# Patient Record
Sex: FEMALE | Race: WHITE | NOT HISPANIC OR LATINO | Employment: UNEMPLOYED | URBAN - METROPOLITAN AREA
[De-identification: names, ages, dates, MRNs, and addresses within clinical notes are randomized per-mention and may not be internally consistent; named-entity substitution may affect disease eponyms.]

---

## 2023-04-24 PROBLEM — F32.A ANXIETY AND DEPRESSION: Status: ACTIVE | Noted: 2023-04-24

## 2023-04-24 PROBLEM — F41.9 ANXIETY AND DEPRESSION: Status: ACTIVE | Noted: 2023-04-24

## 2023-04-24 PROBLEM — F43.10 PTSD (POST-TRAUMATIC STRESS DISORDER): Status: ACTIVE | Noted: 2023-04-24

## 2023-04-24 PROBLEM — R63.5 ABNORMAL WEIGHT GAIN: Status: ACTIVE | Noted: 2023-04-24

## 2023-04-24 RX ORDER — TOPIRAMATE 100 MG/1
100 TABLET, FILM COATED ORAL DAILY
COMMUNITY
Start: 2022-04-03 | End: 2023-07-11

## 2023-04-24 RX ORDER — HYDROXYZINE HYDROCHLORIDE 25 MG/1
25 TABLET, FILM COATED ORAL ONCE
COMMUNITY
Start: 2022-03-03

## 2023-04-24 RX ORDER — ESCITALOPRAM OXALATE 10 MG/1
10 TABLET ORAL DAILY
COMMUNITY
Start: 2022-10-26 | End: 2023-07-11

## 2023-04-24 RX ORDER — ACYCLOVIR 400 MG/1
400 TABLET ORAL
COMMUNITY
Start: 2020-01-20 | End: 2023-07-11 | Stop reason: ALTCHOICE

## 2023-04-24 RX ORDER — CLONIDINE HYDROCHLORIDE 0.1 MG/1
1 TABLET ORAL 2 TIMES DAILY
COMMUNITY
Start: 2022-10-26

## 2023-05-05 ENCOUNTER — APPOINTMENT (OUTPATIENT)
Dept: PRIMARY CARE | Facility: CLINIC | Age: 48
End: 2023-05-05
Payer: MEDICARE

## 2023-07-11 ENCOUNTER — OFFICE VISIT (OUTPATIENT)
Dept: PRIMARY CARE | Facility: CLINIC | Age: 48
End: 2023-07-11
Payer: MEDICARE

## 2023-07-11 VITALS
HEART RATE: 76 BPM | DIASTOLIC BLOOD PRESSURE: 84 MMHG | BODY MASS INDEX: 36.64 KG/M2 | HEIGHT: 66 IN | OXYGEN SATURATION: 98 % | WEIGHT: 228 LBS | TEMPERATURE: 97.4 F | RESPIRATION RATE: 16 BRPM | SYSTOLIC BLOOD PRESSURE: 124 MMHG

## 2023-07-11 DIAGNOSIS — R29.818 SUSPECTED SLEEP APNEA: Primary | ICD-10-CM

## 2023-07-11 DIAGNOSIS — R79.9 ABNORMAL FINDING OF BLOOD CHEMISTRY, UNSPECIFIED: ICD-10-CM

## 2023-07-11 DIAGNOSIS — E78.2 MIXED HYPERLIPIDEMIA: ICD-10-CM

## 2023-07-11 DIAGNOSIS — R53.83 OTHER FATIGUE: ICD-10-CM

## 2023-07-11 DIAGNOSIS — E55.9 VITAMIN D INSUFFICIENCY: ICD-10-CM

## 2023-07-11 DIAGNOSIS — R73.9 HYPERGLYCEMIA: ICD-10-CM

## 2023-07-11 PROCEDURE — 99213 OFFICE O/P EST LOW 20 MIN: CPT

## 2023-07-11 PROCEDURE — 1036F TOBACCO NON-USER: CPT

## 2023-07-11 RX ORDER — METFORMIN HYDROCHLORIDE 750 MG/1
750 TABLET, EXTENDED RELEASE ORAL DAILY
COMMUNITY
Start: 2023-06-21

## 2023-07-11 RX ORDER — VILAZODONE HYDROCHLORIDE 20 MG/1
10 TABLET ORAL 2 TIMES DAILY
COMMUNITY
Start: 2023-04-19

## 2023-07-11 RX ORDER — QUETIAPINE FUMARATE 50 MG/1
50 TABLET, FILM COATED ORAL NIGHTLY
COMMUNITY
Start: 2023-04-19

## 2023-07-11 SDOH — ECONOMIC STABILITY: FOOD INSECURITY: WITHIN THE PAST 12 MONTHS, THE FOOD YOU BOUGHT JUST DIDN'T LAST AND YOU DIDN'T HAVE MONEY TO GET MORE.: NEVER TRUE

## 2023-07-11 SDOH — ECONOMIC STABILITY: FOOD INSECURITY: WITHIN THE PAST 12 MONTHS, YOU WORRIED THAT YOUR FOOD WOULD RUN OUT BEFORE YOU GOT MONEY TO BUY MORE.: NEVER TRUE

## 2023-07-11 ASSESSMENT — ENCOUNTER SYMPTOMS
PSYCHIATRIC NEGATIVE: 1
DIAPHORESIS: 1
SHORTNESS OF BREATH: 1
FATIGUE: 1
CARDIOVASCULAR NEGATIVE: 1
MUSCULOSKELETAL NEGATIVE: 1
NEUROLOGICAL NEGATIVE: 1
EYES NEGATIVE: 1
GASTROINTESTINAL NEGATIVE: 1
ENDOCRINE NEGATIVE: 1
HEMATOLOGIC/LYMPHATIC NEGATIVE: 1

## 2023-07-11 ASSESSMENT — LIFESTYLE VARIABLES
AUDIT-C TOTAL SCORE: 0
HOW MANY STANDARD DRINKS CONTAINING ALCOHOL DO YOU HAVE ON A TYPICAL DAY: PATIENT DOES NOT DRINK
HOW OFTEN DO YOU HAVE SIX OR MORE DRINKS ON ONE OCCASION: NEVER
HOW OFTEN DO YOU HAVE A DRINK CONTAINING ALCOHOL: NEVER
SKIP TO QUESTIONS 9-10: 1

## 2023-07-11 ASSESSMENT — PATIENT HEALTH QUESTIONNAIRE - PHQ9
1. LITTLE INTEREST OR PLEASURE IN DOING THINGS: NOT AT ALL
2. FEELING DOWN, DEPRESSED OR HOPELESS: NOT AT ALL
SUM OF ALL RESPONSES TO PHQ9 QUESTIONS 1 & 2: 0

## 2023-07-11 ASSESSMENT — PAIN SCALES - GENERAL: PAINLEVEL: 0-NO PAIN

## 2023-07-11 NOTE — PATIENT INSTRUCTIONS
Thank you for coming to see me today.  If you have any questions or concerns following our visit, please contact the office.  Phone: [phone]    Follow up with me in 6 months or sooner as needed    1)  Get fasting labwork in the next 1-2 weeks.  The lab is down the [place] from our office.     2)  I am referring you to sleep medicine - please call [phone] to schedule an appointment or stop to 's office (in the lab) on your way out today      3)  Increase your water intake- goal water intake is 64 oz per day     4) Watch salt in diet, many foods already contain high amounts of sodium. Check nutrition facts for serving size and amount of sodium. Goal 2300 mg sodium or less per day    5) Decrease carbohydrates and sugar to assist with weight

## 2023-07-11 NOTE — PROGRESS NOTES
Subjective   Patient ID: Praveena Srtong is a 48 y.o. female who presents for 8 month follow up and visit to hospitals care.    Reports intermittent lower extremity edema that hurts to walk, notices up to calf. Some swelling in her hands.  Has never had these symptoms before. Denies having had a high salt diet, but reports not eating a lot. Snacking a lot.  Feeling very tired around 12:30/1 PM.    Reports energy level is lower than normal but OK. Feeling tired during the day for several months.       Diet: PB&J crustables, Bang energy drink, chips. Eating a lot of strawberries, salad once and a while. Doesn't like to eat a lot of veggies, tends to eat them in the winter- frozen veggies.  Doesn't eat meat, not getting much protein.  Lactose intolerant, not getting a lot of dairy. Not drinking pop a lot or if she does drinks clear soda. Has cut a lot of candy/sugar out of her diet  Exercise: Walking around the block for about 10-20 minutes daily regardless of weather. Moving with her granddaughter who is 6 and constantly moving.   Weight: Started on metformin to assist with weight loss, has never been this heavy before  Water: Drinking 16 oz per day  Sleep: Good sleep, getting at least 6 hours per night. Unsure if she snores, sometimes waking up with headaches, waking up with dry mouth daily.  Has never had sleep testing. Naps daily for about an hour at a time, feels tired a few hours later but doesn't take evening nap  Social: Single- in a relationship, lives with her daughter (25) in an apartment. 14 stairs to get to apartment, no issues with stairs. No pets  Professional: Currently medically disabled, former a  and     Review of Systems   Constitutional:  Positive for diaphoresis and fatigue.   HENT: Negative.     Eyes: Negative.    Respiratory:  Positive for shortness of breath.    Cardiovascular: Negative.    Gastrointestinal: Negative.    Endocrine: Negative.    Genitourinary:  "Negative.    Musculoskeletal: Negative.    Skin: Negative.    Neurological: Negative.    Hematological: Negative.    Psychiatric/Behavioral: Negative.          Current Outpatient Medications   Medication Sig Dispense Refill    cloNIDine (Catapres) 0.1 mg tablet Take 1 tablet (0.1 mg) by mouth 2 times a day.      hydrOXYzine HCL (Atarax) 25 mg tablet Take 1 tablet (25 mg) by mouth 1 time.      metFORMIN XR (Glucophage-XR) 750 mg 24 hr tablet Take 1 tablet (750 mg) by mouth once daily.      QUEtiapine (SEROquel) 50 mg tablet Take 1 tablet (50 mg) by mouth once daily at bedtime.      vilazodone (Viibryd) 10 mg tablet Take 1 tablet (10 mg) by mouth once daily with a meal.       No current facility-administered medications for this visit.     Past Surgical History:   Procedure Laterality Date    OTHER SURGICAL HISTORY  11/04/2022    Partial hysterectomy    OTHER SURGICAL HISTORY  11/04/2022    [place] tooth extraction     Family History   Problem Relation Name Age of Onset    Alcohol abuse Mother      Esophageal varices Father      Diabetes Father      Rheum arthritis Father      Multiple sclerosis Sister      No Known Problems Brother      No Known Problems Daughter        Social History     Tobacco Use    Smoking status: Former     Packs/day: 0.50     Years: 30.00     Total pack years: 15.00     Types: Cigarettes    Smokeless tobacco: Never   Vaping Use    Vaping Use: Every day    Substances: Flavoring    Devices: Disposable, Pre-filled or refillable cartridge    Passive vaping exposure: Yes   Substance Use Topics    Alcohol use: Never    Drug use: Never        Objective     Visit Vitals  /84 (BP Location: Right arm, Patient Position: Sitting, BP Cuff Size: Adult)   Pulse 76   Temp 36.3 °C (97.4 °F) (Temporal)   Resp 16   Ht 1.676 m (5' 6\")   Wt 103 kg (228 lb)   SpO2 98%   BMI 36.80 kg/m²   Smoking Status Former   BSA 2.19 m²        Physical Exam  Constitutional:       Appearance: Normal appearance.   HENT:      " Head: Normocephalic and atraumatic.   Eyes:      Extraocular Movements: Extraocular movements intact.      Pupils: Pupils are equal, round, and reactive to light.   Cardiovascular:      Rate and Rhythm: Normal rate and regular rhythm.   Pulmonary:      Effort: Pulmonary effort is normal.      Breath sounds: Normal breath sounds.   Abdominal:      General: Abdomen is flat. Bowel sounds are normal.      Palpations: Abdomen is soft.   Musculoskeletal:         General: Normal range of motion.   Neurological:      General: No focal deficit present.      Mental Status: She is alert and oriented to person, place, and time.   Psychiatric:         Mood and Affect: Mood normal.         Behavior: Behavior normal.           Assessment/Plan   Problem List Items Addressed This Visit       Mixed hyperlipidemia     Lipid panel from 11/2022 demonstrating , , Tri's 194    Repeat fasting lipid panel  Dietary modifications recommended         Relevant Orders    CBC    Basic Metabolic Panel    Lipid Panel    Follow Up In Primary Care - Established    Suspected sleep apnea - Primary     Persistent fatigue and symptoms as above  Refer to sleep medicine for home sleep study and evaluation for sleep apnea    Check iron, ferritin, B12 and vitamin D to rule out organic causes of fatigue         Relevant Orders    Referral to Adult Sleep Medicine     Other Visit Diagnoses       Other fatigue        Relevant Orders    Iron and TIBC    Ferritin    Vitamin B12    Hyperglycemia        Relevant Orders    Hemoglobin A1C    Follow Up In Primary Care - Established    Vitamin D insufficiency        Relevant Orders    Vitamin D, Total    Abnormal finding of blood chemistry, unspecified        Relevant Orders    Iron and TIBC    Ferritin    Follow Up In Primary Care - Established            All pertinent lab work and results were reviewed with patient.     Follow up with me in 6 months or sooner     MEG Villeda-CNS

## 2023-07-11 NOTE — ASSESSMENT & PLAN NOTE
Persistent fatigue and symptoms as above  Refer to sleep medicine for home sleep study and evaluation for sleep apnea    Check iron, ferritin, B12 and vitamin D to rule out organic causes of fatigue

## 2024-01-03 ENCOUNTER — LAB (OUTPATIENT)
Dept: LAB | Facility: LAB | Age: 49
End: 2024-01-03
Payer: MEDICARE

## 2024-01-03 DIAGNOSIS — R79.9 ABNORMAL FINDING OF BLOOD CHEMISTRY, UNSPECIFIED: ICD-10-CM

## 2024-01-03 DIAGNOSIS — E78.2 MIXED HYPERLIPIDEMIA: ICD-10-CM

## 2024-01-03 DIAGNOSIS — R53.83 OTHER FATIGUE: ICD-10-CM

## 2024-01-03 DIAGNOSIS — R73.9 HYPERGLYCEMIA: ICD-10-CM

## 2024-01-03 DIAGNOSIS — E55.9 VITAMIN D INSUFFICIENCY: ICD-10-CM

## 2024-01-03 LAB
25(OH)D3 SERPL-MCNC: 29 NG/ML (ref 30–100)
ANION GAP SERPL CALC-SCNC: 11 MMOL/L (ref 10–20)
BUN SERPL-MCNC: 11 MG/DL (ref 6–23)
CALCIUM SERPL-MCNC: 10.8 MG/DL (ref 8.6–10.3)
CHLORIDE SERPL-SCNC: 101 MMOL/L (ref 98–107)
CHOLEST SERPL-MCNC: 226 MG/DL (ref 0–199)
CHOLESTEROL/HDL RATIO: 5
CO2 SERPL-SCNC: 30 MMOL/L (ref 21–32)
CREAT SERPL-MCNC: 1.09 MG/DL (ref 0.5–1.05)
ERYTHROCYTE [DISTWIDTH] IN BLOOD BY AUTOMATED COUNT: 14.3 % (ref 11.5–14.5)
FERRITIN SERPL-MCNC: 113 NG/ML (ref 8–150)
GFR SERPL CREATININE-BSD FRML MDRD: 64 ML/MIN/1.73M*2
GLUCOSE SERPL-MCNC: 75 MG/DL (ref 74–99)
HCT VFR BLD AUTO: 46.9 % (ref 36–46)
HDLC SERPL-MCNC: 44.9 MG/DL
HGB BLD-MCNC: 14.9 G/DL (ref 12–16)
IRON SATN MFR SERPL: 20 % (ref 25–45)
IRON SERPL-MCNC: 70 UG/DL (ref 35–150)
LDLC SERPL CALC-MCNC: 118 MG/DL
MCH RBC QN AUTO: 29.8 PG (ref 26–34)
MCHC RBC AUTO-ENTMCNC: 31.8 G/DL (ref 32–36)
MCV RBC AUTO: 94 FL (ref 80–100)
NON HDL CHOLESTEROL: 181 MG/DL (ref 0–149)
NRBC BLD-RTO: 0 /100 WBCS (ref 0–0)
PLATELET # BLD AUTO: 335 X10*3/UL (ref 150–450)
POTASSIUM SERPL-SCNC: 4.2 MMOL/L (ref 3.5–5.3)
RBC # BLD AUTO: 5 X10*6/UL (ref 4–5.2)
SODIUM SERPL-SCNC: 138 MMOL/L (ref 136–145)
TIBC SERPL-MCNC: 354 UG/DL (ref 240–445)
TRIGL SERPL-MCNC: 317 MG/DL (ref 0–149)
UIBC SERPL-MCNC: 284 UG/DL (ref 110–370)
VIT B12 SERPL-MCNC: 329 PG/ML (ref 211–911)
VLDL: 63 MG/DL (ref 0–40)
WBC # BLD AUTO: 8.1 X10*3/UL (ref 4.4–11.3)

## 2024-01-03 PROCEDURE — 83540 ASSAY OF IRON: CPT

## 2024-01-03 PROCEDURE — 85027 COMPLETE CBC AUTOMATED: CPT

## 2024-01-03 PROCEDURE — 80048 BASIC METABOLIC PNL TOTAL CA: CPT

## 2024-01-03 PROCEDURE — 83550 IRON BINDING TEST: CPT

## 2024-01-03 PROCEDURE — 82306 VITAMIN D 25 HYDROXY: CPT

## 2024-01-03 PROCEDURE — 83036 HEMOGLOBIN GLYCOSYLATED A1C: CPT

## 2024-01-03 PROCEDURE — 82607 VITAMIN B-12: CPT

## 2024-01-03 PROCEDURE — 82728 ASSAY OF FERRITIN: CPT

## 2024-01-03 PROCEDURE — 36415 COLL VENOUS BLD VENIPUNCTURE: CPT

## 2024-01-03 PROCEDURE — 80061 LIPID PANEL: CPT

## 2024-01-04 LAB
EST. AVERAGE GLUCOSE BLD GHB EST-MCNC: 123 MG/DL
HBA1C MFR BLD: 5.9 %

## 2024-01-16 ENCOUNTER — OFFICE VISIT (OUTPATIENT)
Dept: PRIMARY CARE | Facility: CLINIC | Age: 49
End: 2024-01-16
Payer: MEDICARE

## 2024-01-16 VITALS
HEART RATE: 78 BPM | BODY MASS INDEX: 34.87 KG/M2 | WEIGHT: 217 LBS | HEIGHT: 66 IN | TEMPERATURE: 97.3 F | SYSTOLIC BLOOD PRESSURE: 114 MMHG | RESPIRATION RATE: 16 BRPM | DIASTOLIC BLOOD PRESSURE: 80 MMHG | OXYGEN SATURATION: 97 %

## 2024-01-16 DIAGNOSIS — E55.9 VITAMIN D DEFICIENCY: ICD-10-CM

## 2024-01-16 DIAGNOSIS — Z78.0 POST-MENOPAUSAL: ICD-10-CM

## 2024-01-16 DIAGNOSIS — F17.200 TOBACCO DEPENDENCE: ICD-10-CM

## 2024-01-16 DIAGNOSIS — R73.9 HYPERGLYCEMIA: ICD-10-CM

## 2024-01-16 DIAGNOSIS — E78.2 MIXED HYPERLIPIDEMIA: ICD-10-CM

## 2024-01-16 DIAGNOSIS — R79.9 ABNORMAL FINDING OF BLOOD CHEMISTRY, UNSPECIFIED: ICD-10-CM

## 2024-01-16 DIAGNOSIS — Z12.11 COLON CANCER SCREENING: ICD-10-CM

## 2024-01-16 DIAGNOSIS — Z00.00 ENCOUNTER FOR MEDICARE ANNUAL WELLNESS EXAM: ICD-10-CM

## 2024-01-16 DIAGNOSIS — Z23 NEED FOR PNEUMOCOCCAL VACCINATION: ICD-10-CM

## 2024-01-16 DIAGNOSIS — Z12.31 BREAST CANCER SCREENING BY MAMMOGRAM: ICD-10-CM

## 2024-01-16 DIAGNOSIS — Z00.00 ROUTINE GENERAL MEDICAL EXAMINATION AT HEALTH CARE FACILITY: Primary | ICD-10-CM

## 2024-01-16 DIAGNOSIS — F31.81 BIPOLAR 2 DISORDER (MULTI): ICD-10-CM

## 2024-01-16 DIAGNOSIS — Z23 NEED FOR INFLUENZA VACCINATION: ICD-10-CM

## 2024-01-16 PROBLEM — R73.03 PREDIABETES: Status: ACTIVE | Noted: 2024-01-16

## 2024-01-16 PROCEDURE — G0438 PPPS, INITIAL VISIT: HCPCS

## 2024-01-16 PROCEDURE — 90686 IIV4 VACC NO PRSV 0.5 ML IM: CPT

## 2024-01-16 PROCEDURE — G0008 ADMIN INFLUENZA VIRUS VAC: HCPCS

## 2024-01-16 PROCEDURE — G0009 ADMIN PNEUMOCOCCAL VACCINE: HCPCS

## 2024-01-16 PROCEDURE — 1036F TOBACCO NON-USER: CPT

## 2024-01-16 PROCEDURE — 90677 PCV20 VACCINE IM: CPT

## 2024-01-16 RX ORDER — CHOLECALCIFEROL (VITAMIN D3) 50 MCG
50 TABLET ORAL DAILY
Qty: 30 TABLET | Refills: 11 | Status: SHIPPED | OUTPATIENT
Start: 2024-01-16 | End: 2025-01-15

## 2024-01-16 SDOH — ECONOMIC STABILITY: FOOD INSECURITY: WITHIN THE PAST 12 MONTHS, YOU WORRIED THAT YOUR FOOD WOULD RUN OUT BEFORE YOU GOT MONEY TO BUY MORE.: NEVER TRUE

## 2024-01-16 SDOH — ECONOMIC STABILITY: FOOD INSECURITY: WITHIN THE PAST 12 MONTHS, THE FOOD YOU BOUGHT JUST DIDN'T LAST AND YOU DIDN'T HAVE MONEY TO GET MORE.: NEVER TRUE

## 2024-01-16 ASSESSMENT — PATIENT HEALTH QUESTIONNAIRE - PHQ9
8. MOVING OR SPEAKING SO SLOWLY THAT OTHER PEOPLE COULD HAVE NOTICED. OR THE OPPOSITE, BEING SO FIGETY OR RESTLESS THAT YOU HAVE BEEN MOVING AROUND A LOT MORE THAN USUAL: MORE THAN HALF THE DAYS
SUM OF ALL RESPONSES TO PHQ9 QUESTIONS 1 & 2: 6
3. TROUBLE FALLING OR STAYING ASLEEP: NEARLY EVERY DAY
SUM OF ALL RESPONSES TO PHQ QUESTIONS 1-9: 20
5. POOR APPETITE OR OVEREATING: NEARLY EVERY DAY
6. FEELING BAD ABOUT YOURSELF - OR THAT YOU ARE A FAILURE OR HAVE LET YOURSELF OR YOUR FAMILY DOWN: NOT AT ALL
2. FEELING DOWN, DEPRESSED OR HOPELESS: NEARLY EVERY DAY
10. IF YOU CHECKED OFF ANY PROBLEMS, HOW DIFFICULT HAVE THESE PROBLEMS MADE IT FOR YOU TO DO YOUR WORK, TAKE CARE OF THINGS AT HOME, OR GET ALONG WITH OTHER PEOPLE: SOMEWHAT DIFFICULT
9. THOUGHTS THAT YOU WOULD BE BETTER OFF DEAD, OR OF HURTING YOURSELF: NOT AT ALL
4. FEELING TIRED OR HAVING LITTLE ENERGY: NEARLY EVERY DAY
7. TROUBLE CONCENTRATING ON THINGS, SUCH AS READING THE NEWSPAPER OR WATCHING TELEVISION: NEARLY EVERY DAY
1. LITTLE INTEREST OR PLEASURE IN DOING THINGS: NEARLY EVERY DAY

## 2024-01-16 ASSESSMENT — ACTIVITIES OF DAILY LIVING (ADL)
TAKING_MEDICATION: INDEPENDENT
MANAGING_FINANCES: INDEPENDENT
GROCERY_SHOPPING: INDEPENDENT
BATHING: INDEPENDENT
DOING_HOUSEWORK: INDEPENDENT
DRESSING: INDEPENDENT

## 2024-01-16 ASSESSMENT — ENCOUNTER SYMPTOMS
CONSTITUTIONAL NEGATIVE: 1
DEPRESSION: 0
LOSS OF SENSATION IN FEET: 0
EYES NEGATIVE: 1
ENDOCRINE NEGATIVE: 1
RESPIRATORY NEGATIVE: 1
CARDIOVASCULAR NEGATIVE: 1
OCCASIONAL FEELINGS OF UNSTEADINESS: 0
MUSCULOSKELETAL NEGATIVE: 1
GASTROINTESTINAL NEGATIVE: 1
NEUROLOGICAL NEGATIVE: 1
HEMATOLOGIC/LYMPHATIC NEGATIVE: 1
PSYCHIATRIC NEGATIVE: 1

## 2024-01-16 ASSESSMENT — LIFESTYLE VARIABLES
HOW OFTEN DO YOU HAVE A DRINK CONTAINING ALCOHOL: NEVER
HOW OFTEN DO YOU HAVE SIX OR MORE DRINKS ON ONE OCCASION: NEVER
AUDIT-C TOTAL SCORE: 0
SKIP TO QUESTIONS 9-10: 1
HOW MANY STANDARD DRINKS CONTAINING ALCOHOL DO YOU HAVE ON A TYPICAL DAY: PATIENT DOES NOT DRINK

## 2024-01-16 ASSESSMENT — PAIN SCALES - GENERAL: PAINLEVEL: 0-NO PAIN

## 2024-01-16 NOTE — ASSESSMENT & PLAN NOTE
Reportedly getting Metformin from her psychiatrist Dr. Aponte    Continue metformin XR 750mg daily

## 2024-01-16 NOTE — PATIENT INSTRUCTIONS
Thank you for coming to see me today.  If you have any questions or concerns following our visit, please contact the office.  Phone: [phone]    Follow up with me in 6 months or sooner as needed    1)  Please schedule a mammogram and bone density scan - please call [phone] or stop to 's office (in the lab office) on your way out today.     2) You need colonoscopy screening- [facility] to get you scheduled for this     3) Schedule appointment with sleep medicine to evaluate for sleep apnea. Call [phone] to schedule this appointment    4) Consider purchasing copper gloves at drug store to wear overnight     5) START over the counter vitamin D 2000 units daily

## 2024-01-16 NOTE — ASSESSMENT & PLAN NOTE
Reports she has been smoke free since November 2023  Reports still having cravings daily but has been able to resist

## 2024-01-16 NOTE — PROGRESS NOTES
Subjective   Patient ID: Praveena Strong is a 48 y.o. female who presents for 6 month follow up.    Reports multiple family members since last visit. Has had COVID twice, hasn't scheduled appt with sleep medicine due to this.    Diet: PB&J crustables, Bang energy drink, chips. Eating a lot of strawberries, salad once and a while. Doesn't like to eat a lot of veggies, tends to eat them in the winter- frozen veggies.  Doesn't eat meat, not getting much protein.  Lactose intolerant, not getting a lot of dairy. Not drinking pop a lot or if she does drinks clear soda. Has cut a lot of candy/sugar out of her diet  Exercise: Walking around the block for about 10-20 minutes daily regardless of weather. Moving with her granddaughter who is 6 and constantly moving.   Weight: Started on metformin to assist with weight loss, has never been this heavy before  Water: Drinking 16 oz per day  Sleep: Good sleep, getting at least 6 hours per night. Unsure if she snores, sometimes waking up with headaches, waking up with dry mouth daily.  Has never had sleep testing. Naps daily for about an hour at a time, feels tired a few hours later but doesn't take evening nap  Social: Single- in a relationship, lives with her daughter (25) in an apartment. 14 stairs to get to apartment, no issues with stairs. No pets  Professional: Currently medically disabled, former a  and     Review of Systems   Constitutional: Negative.    HENT: Negative.     Eyes: Negative.    Respiratory: Negative.     Cardiovascular: Negative.    Gastrointestinal: Negative.    Endocrine: Negative.    Genitourinary: Negative.    Musculoskeletal: Negative.    Skin: Negative.    Neurological: Negative.    Hematological: Negative.    Psychiatric/Behavioral: Negative.          Current Outpatient Medications   Medication Sig Dispense Refill    cloNIDine (Catapres) 0.1 mg tablet Take 1 tablet (0.1 mg) by mouth 2 times a day.      hydrOXYzine HCL  "(Atarax) 25 mg tablet Take 1 tablet (25 mg) by mouth 1 time.      metFORMIN XR (Glucophage-XR) 750 mg 24 hr tablet Take 1 tablet (750 mg) by mouth once daily.      QUEtiapine (SEROquel) 50 mg tablet Take 1 tablet (50 mg) by mouth once daily at bedtime.      vilazodone (Viibryd) 20 mg tablet Take 0.5 tablets (10 mg) by mouth 2 times a day.      cholecalciferol (Vitamin D-3) 50 MCG (2000 UT) tablet Take 1 tablet (50 mcg) by mouth once daily. 30 tablet 11     No current facility-administered medications for this visit.     Past Surgical History:   Procedure Laterality Date    OTHER SURGICAL HISTORY  11/04/2022    Partial hysterectomy    OTHER SURGICAL HISTORY  11/04/2022    [place] tooth extraction     Family History   Problem Relation Name Age of Onset    Alcohol abuse Mother      Esophageal varices Father      Diabetes Father      Rheum arthritis Father      Multiple sclerosis Sister      No Known Problems Brother      No Known Problems Daughter        Social History     Tobacco Use    Smoking status: Former     Packs/day: 0.50     Years: 30.00     Additional pack years: 0.00     Total pack years: 15.00     Types: Cigarettes    Smokeless tobacco: Never   Vaping Use    Vaping Use: Every day    Substances: Flavoring    Devices: Disposable, Pre-filled or refillable cartridge    Passive vaping exposure: Yes   Substance Use Topics    Alcohol use: Never    Drug use: Never        Objective     Visit Vitals  /80 (BP Location: Left arm, Patient Position: Sitting, BP Cuff Size: Large adult)   Pulse 78   Temp 36.3 °C (97.3 °F)   Resp 16   Ht 1.676 m (5' 6\")   Wt 98.4 kg (217 lb)   SpO2 97%   BMI 35.02 kg/m²   Smoking Status Former   BSA 2.14 m²        Physical Exam  Constitutional:       Appearance: Normal appearance.   HENT:      Head: Normocephalic and atraumatic.   Eyes:      Extraocular Movements: Extraocular movements intact.      Pupils: Pupils are equal, round, and reactive to light.   Cardiovascular:      Rate and " Rhythm: Normal rate and regular rhythm.   Pulmonary:      Effort: Pulmonary effort is normal.      Breath sounds: Normal breath sounds.   Abdominal:      General: Abdomen is flat. Bowel sounds are normal.      Palpations: Abdomen is soft.   Musculoskeletal:         General: Swelling present. Normal range of motion.      Right hand: Swelling present.      Left hand: Swelling present.      Comments: Bilateral hand 1-10th digits stiff, limited mobility of DIP joint   Skin:     General: Skin is warm and dry.      Capillary Refill: Capillary refill takes less than 2 seconds.   Neurological:      General: No focal deficit present.      Mental Status: She is alert and oriented to person, place, and time.   Psychiatric:         Mood and Affect: Mood normal.         Behavior: Behavior normal.           Assessment/Plan   Problem List Items Addressed This Visit       Mixed hyperlipidemia     LDL cholesterol mildly elevated at 118 on labs from 1/2024    Encouraged to add more fresh fruits, veggies and lean proteins to diet         Encounter for Medicare annual wellness exam     Wellness screenings/Immunizations:  Flu vaccination: Recommended annually  PCV: PCV 20  PPSV: Deferred  Shingrix vaccine: Deferred  Colon cancer screening: Recommended and ordered  Mammogram: Recommended and ordered  DEXA scan: Recommended and ordered           Bipolar 2 disorder (CMS/HCC)    Tobacco dependence     Reports she has been smoke free since November 2023  Reports still having cravings daily but has been able to resist         Vitamin D deficiency     Vitamin D of 29 on labs from 1/2024  Advised start vitamin D 2000 units daily         Relevant Medications    cholecalciferol (Vitamin D-3) 50 MCG (2000 UT) tablet     Other Visit Diagnoses       Routine general medical examination at health care facility    -  Primary    Hyperglycemia        Abnormal finding of blood chemistry, unspecified        Colon cancer screening        Relevant Orders     Colonoscopy Screening; Average Risk Patient    Need for influenza vaccination        Relevant Orders    Flu vaccine (IIV4) age 6 months and greater, preservative free    Need for pneumococcal vaccination        Relevant Orders    Pneumococcal conjugate vaccine, 20-valent (PREVNAR 20)    Breast cancer screening by mammogram        Relevant Orders    BI mammo bilateral screening tomosynthesis    Post-menopausal        Relevant Orders    XR DEXA bone density            All pertinent lab work and results were reviewed with patient.     Follow up with me in 6 months     Magalis Hutchison, APRN-CNS

## 2024-01-16 NOTE — ASSESSMENT & PLAN NOTE
Wellness screenings/Immunizations:  Flu vaccination: Recommended annually  PCV: PCV 20  PPSV: Deferred  Shingrix vaccine: Deferred  Colon cancer screening: Recommended and ordered  Mammogram: Recommended and ordered  DEXA scan: Recommended and ordered

## 2024-01-24 ENCOUNTER — APPOINTMENT (OUTPATIENT)
Dept: RADIOLOGY | Facility: CLINIC | Age: 49
End: 2024-01-24
Payer: MEDICARE

## 2024-01-25 ENCOUNTER — TELEPHONE (OUTPATIENT)
Dept: GASTROENTEROLOGY | Facility: CLINIC | Age: 49
End: 2024-01-25
Payer: MEDICARE

## 2024-01-25 NOTE — TELEPHONE ENCOUNTER
----- Message from Estephania Byers MA sent at 1/25/2024  1:53 PM EST -----  Regarding: RE: Screening Colonoscopy  LETTER SENT VIA [employer] FOR PATIENT TO CONTACT THE OFFICE  ----- Message -----  From: Estephania Byers MA  Sent: 1/23/2024   1:52 PM EST  To: [id number]er[facility]  Subject: RE: Screening Colonoscopy                        Left message on machine to return call     ----- Message -----  From: Estephania Byers MA  Sent: 1/17/2024  10:12 AM EST  To: [id number]er[facility]  Subject: RE: Screening Colonoscopy                        Left message on machine to return call     ----- Message -----  From: Codi Barcenas RN  Sent: 1/16/2024   2:16 PM EST  To: Do [id number]er[facility]  Subject: Screening Colonoscopy                            Open Access

## 2024-02-16 ENCOUNTER — APPOINTMENT (OUTPATIENT)
Dept: RADIOLOGY | Facility: CLINIC | Age: 49
End: 2024-02-16
Payer: MEDICARE

## 2024-03-20 ENCOUNTER — TELEPHONE (OUTPATIENT)
Dept: PHARMACY | Facility: HOSPITAL | Age: 49
End: 2024-03-20
Payer: MEDICARE

## 2024-03-20 NOTE — TELEPHONE ENCOUNTER
Population Health: Outreach by Ambulatory Pharmacy Team    Payor: Samantha MADRID  Reason: Adherence  Medication: METFORMIN 750 MG TABLET ER  Outcome: Patient Reached: Patient was taking this for weight loss. She is no longer on this medication    Nadeen Ybarra, LeslieD

## 2024-04-11 ENCOUNTER — TELEPHONE (OUTPATIENT)
Dept: PHARMACY | Facility: HOSPITAL | Age: 49
End: 2024-04-11
Payer: MEDICARE

## 2024-04-11 NOTE — TELEPHONE ENCOUNTER
Population Health: Outreach by Ambulatory Pharmacy Team    Payor: Samantha MADRID  Reason: Adherence  Medication: Metformin 750 mg   Outcome: Left Voicemail    Jero Arauz, PharmD

## 2024-07-18 ENCOUNTER — APPOINTMENT (OUTPATIENT)
Dept: PRIMARY CARE | Facility: CLINIC | Age: 49
End: 2024-07-18
Payer: MEDICARE

## 2024-07-22 ENCOUNTER — APPOINTMENT (OUTPATIENT)
Dept: RADIOLOGY | Facility: HOSPITAL | Age: 49
End: 2024-07-22
Payer: MEDICARE

## 2024-08-12 ENCOUNTER — APPOINTMENT (OUTPATIENT)
Dept: RADIOLOGY | Facility: HOSPITAL | Age: 49
End: 2024-08-12
Payer: MEDICARE

## 2024-08-13 ENCOUNTER — HOSPITAL ENCOUNTER (OUTPATIENT)
Dept: RADIOLOGY | Facility: CLINIC | Age: 49
End: 2024-08-13
Payer: MEDICARE

## 2024-09-19 ENCOUNTER — APPOINTMENT (OUTPATIENT)
Dept: PRIMARY CARE | Facility: CLINIC | Age: 49
End: 2024-09-19
Payer: MEDICARE

## 2024-09-19 VITALS
HEART RATE: 80 BPM | WEIGHT: 243 LBS | DIASTOLIC BLOOD PRESSURE: 89 MMHG | TEMPERATURE: 97.1 F | OXYGEN SATURATION: 98 % | SYSTOLIC BLOOD PRESSURE: 133 MMHG | RESPIRATION RATE: 19 BRPM | BODY MASS INDEX: 39.22 KG/M2

## 2024-09-19 DIAGNOSIS — F15.20 OTHER STIMULANT DEPENDENCE, UNCOMPLICATED: ICD-10-CM

## 2024-09-19 DIAGNOSIS — R73.9 HYPERGLYCEMIA: ICD-10-CM

## 2024-09-19 DIAGNOSIS — Z13.0 SCREENING FOR DEFICIENCY ANEMIA: Primary | ICD-10-CM

## 2024-09-19 DIAGNOSIS — E78.2 MIXED HYPERLIPIDEMIA: ICD-10-CM

## 2024-09-19 DIAGNOSIS — E55.9 VITAMIN D DEFICIENCY: ICD-10-CM

## 2024-09-19 DIAGNOSIS — Z12.31 BREAST CANCER SCREENING BY MAMMOGRAM: ICD-10-CM

## 2024-09-19 DIAGNOSIS — R63.5 WEIGHT GAIN: ICD-10-CM

## 2024-09-19 DIAGNOSIS — Z12.11 COLON CANCER SCREENING: ICD-10-CM

## 2024-09-19 DIAGNOSIS — F31.81 BIPOLAR 2 DISORDER (MULTI): ICD-10-CM

## 2024-09-19 PROCEDURE — 99213 OFFICE O/P EST LOW 20 MIN: CPT

## 2024-09-19 RX ORDER — OFLOXACIN 3 MG/ML
SOLUTION/ DROPS OPHTHALMIC
COMMUNITY
Start: 2024-01-29

## 2024-09-19 RX ORDER — FLUOXETINE HYDROCHLORIDE 20 MG/1
CAPSULE ORAL
COMMUNITY
Start: 2024-08-31 | End: 2024-09-19 | Stop reason: ALTCHOICE

## 2024-09-19 RX ORDER — TRAZODONE HYDROCHLORIDE 50 MG/1
TABLET ORAL
COMMUNITY
Start: 2024-08-21

## 2024-09-19 RX ORDER — FLUOXETINE HYDROCHLORIDE 40 MG/1
1 CAPSULE ORAL
COMMUNITY
Start: 2024-08-31

## 2024-09-19 RX ORDER — FLUOXETINE HYDROCHLORIDE 20 MG/1
20 CAPSULE ORAL DAILY
COMMUNITY

## 2024-09-19 SDOH — ECONOMIC STABILITY: FOOD INSECURITY: WITHIN THE PAST 12 MONTHS, THE FOOD YOU BOUGHT JUST DIDN'T LAST AND YOU DIDN'T HAVE MONEY TO GET MORE.: NEVER TRUE

## 2024-09-19 SDOH — ECONOMIC STABILITY: FOOD INSECURITY: WITHIN THE PAST 12 MONTHS, YOU WORRIED THAT YOUR FOOD WOULD RUN OUT BEFORE YOU GOT MONEY TO BUY MORE.: NEVER TRUE

## 2024-09-19 ASSESSMENT — PATIENT HEALTH QUESTIONNAIRE - PHQ9
10. IF YOU CHECKED OFF ANY PROBLEMS, HOW DIFFICULT HAVE THESE PROBLEMS MADE IT FOR YOU TO DO YOUR WORK, TAKE CARE OF THINGS AT HOME, OR GET ALONG WITH OTHER PEOPLE: VERY DIFFICULT
2. FEELING DOWN, DEPRESSED OR HOPELESS: NEARLY EVERY DAY
1. LITTLE INTEREST OR PLEASURE IN DOING THINGS: NEARLY EVERY DAY
7. TROUBLE CONCENTRATING ON THINGS, SUCH AS READING THE NEWSPAPER OR WATCHING TELEVISION: NOT AT ALL
3. TROUBLE FALLING OR STAYING ASLEEP: NEARLY EVERY DAY
SUM OF ALL RESPONSES TO PHQ QUESTIONS 1-9: 18
6. FEELING BAD ABOUT YOURSELF - OR THAT YOU ARE A FAILURE OR HAVE LET YOURSELF OR YOUR FAMILY DOWN: NEARLY EVERY DAY
9. THOUGHTS THAT YOU WOULD BE BETTER OFF DEAD, OR OF HURTING YOURSELF: NOT AT ALL
4. FEELING TIRED OR HAVING LITTLE ENERGY: NEARLY EVERY DAY
5. POOR APPETITE OR OVEREATING: NEARLY EVERY DAY
SUM OF ALL RESPONSES TO PHQ9 QUESTIONS 1 & 2: 6
8. MOVING OR SPEAKING SO SLOWLY THAT OTHER PEOPLE COULD HAVE NOTICED. OR THE OPPOSITE, BEING SO FIGETY OR RESTLESS THAT YOU HAVE BEEN MOVING AROUND A LOT MORE THAN USUAL: NOT AT ALL

## 2024-09-19 ASSESSMENT — LIFESTYLE VARIABLES
HOW OFTEN DO YOU HAVE A DRINK CONTAINING ALCOHOL: NEVER
SKIP TO QUESTIONS 9-10: 1
HOW MANY STANDARD DRINKS CONTAINING ALCOHOL DO YOU HAVE ON A TYPICAL DAY: PATIENT DOES NOT DRINK
HOW OFTEN DO YOU HAVE SIX OR MORE DRINKS ON ONE OCCASION: NEVER
AUDIT-C TOTAL SCORE: 0

## 2024-09-19 ASSESSMENT — ENCOUNTER SYMPTOMS
PSYCHIATRIC NEGATIVE: 1
CONSTITUTIONAL NEGATIVE: 1
GASTROINTESTINAL NEGATIVE: 1
NEUROLOGICAL NEGATIVE: 1
HEMATOLOGIC/LYMPHATIC NEGATIVE: 1
RESPIRATORY NEGATIVE: 1
ENDOCRINE NEGATIVE: 1
CARDIOVASCULAR NEGATIVE: 1
EYES NEGATIVE: 1
MUSCULOSKELETAL NEGATIVE: 1

## 2024-09-19 ASSESSMENT — PAIN SCALES - GENERAL: PAINLEVEL: 7

## 2024-09-19 NOTE — LETTER
To Whom It May Concern:       Praveena Strong is my patient, and has been under my care for 2 years.   I am very familiar with her history and with the functional limitations imposed by her emotional related illness.    Due to this disability, this patient has certain limitations related to coping with stress and anxiety. In order to help alleviate these difficulties, and to enhance her ability to function independently, I have prescribed this patient to obtain a pet or emotional support animal. The presence of this animal is necessary for the patient's emotional and mental health because its presence will mitigate the symptoms she is currently experiencing.      Sincerely,        Magalis WEAVER-CNS

## 2024-09-19 NOTE — ASSESSMENT & PLAN NOTE
Started on fluoxetine 60mg daily by Dr. Aponte at [facility] around March 2024  Maintained on trazodone 50mg nightly and clonidine 0.1mg BID  Continue following with Dr. Aponte

## 2024-09-19 NOTE — ASSESSMENT & PLAN NOTE
Reports she has been sober from substance dependence for 5 years  No longer smoking, periodically vaping.  Encouraged on her efforts

## 2024-09-19 NOTE — PROGRESS NOTES
Subjective   Patient ID: Praveena Strong is a 49 y.o. female who presents for 9 month follow up.    Concerned about weight, states she's gained 27lbs over the last 9 months. Concerned about joint and hip pain, feet swelling.    Concerned about thryoid levels, constantly feeling cold and tired. No hair growth but not falling out.   Follows with Dr. Aponte at [facility] who recently started her on fluoxetine about 6 months ago    Reports shortness of breath after walking up 14 stairs, has to rest to catch her breath prior to putting her groceries away.     Diet: Boyfriend cooking at home, chicken breasts, brussel sprouts, tomato/mozzarella/spinach salad with balsamic vineger. Mediterranean salad which is lunch most day. 1 bowl of fruit loops with milk for breakfast. Eating a lot of strawberries, salad more often than not. Doesn't like to eat a lot of veggies, tends to eat them in the winter- frozen veggies.  Chicken is main protein, no beef or pork.  Lactose intolerant, not getting a lot of dairy. Not drinking pop a lot or if she does drinks clear soda. Has cut a lot of candy/sugar out of her diet  Exercise: Walking around the block twice a few times per week, on weekends with her boyfriend and walking around stores. For about 10-20 minutes daily regardless of weather. Moving with her granddaughter who is 6 and constantly moving.   Weight: Up 27lbs over the last 9 months unplanned  Water: Drinking 48 oz per day, 1 cup coffee in the morning, 1 bottle Prime per day. Sprite once or twice per month  Sleep: Good sleep, getting at least 6 hours per night. Unsure if she snores, sometimes waking up with headaches, waking up with dry mouth daily.  Has never had sleep testing. Naps daily for about an hour at a time, feels tired a few hours later but doesn't take evening nap  Social: Single- in a relationship with male partner, lives with her daughter (25) in an apartment. 14 stairs to get to apartment, no issues with stairs. No  pets  Professional: Currently medically disabled, former a  and     Review of Systems   Constitutional: Negative.    HENT: Negative.     Eyes: Negative.    Respiratory: Negative.     Cardiovascular: Negative.    Gastrointestinal: Negative.    Endocrine: Negative.    Genitourinary: Negative.    Musculoskeletal: Negative.    Skin: Negative.    Neurological: Negative.    Hematological: Negative.    Psychiatric/Behavioral: Negative.          Current Outpatient Medications   Medication Sig Dispense Refill    cholecalciferol (Vitamin D-3) 50 MCG (2000 UT) tablet Take 1 tablet (50 mcg) by mouth once daily. 30 tablet 11    cloNIDine (Catapres) 0.1 mg tablet Take 1 tablet (0.1 mg) by mouth 2 times a day.      FLUoxetine (PROzac) 40 mg capsule Take 1 capsule (40 mg) by mouth early in the morning..      ofloxacin (Ocuflox) 0.3 % ophthalmic solution instill 2 drops into the affected eye(s) 4 times per day for 5 days      traZODone (Desyrel) 50 mg tablet TAKE 1 TO 2 TABLETS BY MOUTH DAILY AT BEDTIME  AS DIRECTED AS NEEDED FOR SLEEP.      FLUoxetine (PROzac) 20 mg capsule Take 1 capsule (20 mg) by mouth once daily.      metFORMIN XR (Glucophage-XR) 750 mg 24 hr tablet Take 1 tablet (750 mg) by mouth once daily.       No current facility-administered medications for this visit.     Past Surgical History:   Procedure Laterality Date    OTHER SURGICAL HISTORY  11/04/2022    Partial hysterectomy    OTHER SURGICAL HISTORY  11/04/2022    [place] tooth extraction     Family History   Problem Relation Name Age of Onset    Alcohol abuse Mother      Esophageal varices Father      Diabetes Father      Rheum arthritis Father      Multiple sclerosis Sister      No Known Problems Brother      No Known Problems Daughter        Social History     Tobacco Use    Smoking status: Former     Current packs/day: 0.50     Average packs/day: 0.5 packs/day for 30.0 years (15.0 ttl pk-yrs)     Types: Cigarettes    Smokeless  tobacco: Never   Vaping Use    Vaping status: Some Days    Substances: Flavoring    Devices: Disposable, Pre-filled or refillable cartridge    Passive vaping exposure: Yes   Substance Use Topics    Alcohol use: Never    Drug use: Never        Objective     Visit Vitals  /89 (BP Location: Left arm, Patient Position: Sitting)   Pulse 80   Temp 36.2 °C (97.1 °F) (Temporal)   Resp 19   Wt 110 kg (243 lb)   SpO2 98%   BMI 39.22 kg/m²   Smoking Status Former   BSA 2.26 m²        Physical Exam  Constitutional:       Appearance: Normal appearance.   HENT:      Head: Normocephalic and atraumatic.   Eyes:      Extraocular Movements: Extraocular movements intact.      Pupils: Pupils are equal, round, and reactive to light.   Cardiovascular:      Rate and Rhythm: Normal rate and regular rhythm.   Pulmonary:      Effort: Pulmonary effort is normal.      Breath sounds: Normal breath sounds.   Abdominal:      General: Abdomen is flat. Bowel sounds are normal.      Palpations: Abdomen is soft.   Musculoskeletal:         General: Normal range of motion.   Skin:     General: Skin is warm and dry.      Capillary Refill: Capillary refill takes less than 2 seconds.   Neurological:      General: No focal deficit present.      Mental Status: She is alert and oriented to person, place, and time.   Psychiatric:         Mood and Affect: Mood normal.         Behavior: Behavior normal.           Assessment/Plan   Problem List Items Addressed This Visit       Mixed hyperlipidemia    Relevant Orders    Lipid Panel    Bipolar 2 disorder (Multi)     Started on fluoxetine 60mg daily by Dr. Aponte at [facility] around March 2024  Maintained on trazodone 50mg nightly and clonidine 0.1mg BID  Continue following with Dr. Aponte         Vitamin D deficiency    Relevant Orders    Vitamin D 25-Hydroxy,Total (for eval of Vitamin D levels)    Other stimulant dependence, uncomplicated (Multi)     Reports she has been sober from substance dependence for 5  years  No longer smoking, periodically vaping.  Encouraged on her efforts           Other Visit Diagnoses       Screening for deficiency anemia    -  Primary    Weight gain        Relevant Orders    CBC    Comprehensive Metabolic Panel    Hemoglobin A1C    TSH with reflex to Free T4 if abnormal    Colon cancer screening        Relevant Orders    Cologuard® colon cancer screening    Hyperglycemia        Relevant Orders    Hemoglobin A1C    Breast cancer screening by mammogram        Relevant Orders    BI mammo bilateral screening tomosynthesis            All pertinent lab work and results were reviewed with patient.     Follow up with me in 2-3 months    Magalis Hutchison, APRN-CNS

## 2024-09-19 NOTE — PATIENT INSTRUCTIONS
Thank you for coming to see me today.  If you have any questions or concerns following our visit, please contact the office.  Phone: [phone]    Follow up with me in 2-3 months for weight gain    1)  Get fasting labwork today.  The lab is down the [place] from our office.     2) Please schedule a mammogram - please call [phone] or schedule at  on your way out today

## 2024-10-04 ENCOUNTER — APPOINTMENT (OUTPATIENT)
Dept: RADIOLOGY | Facility: CLINIC | Age: 49
End: 2024-10-04
Payer: MEDICARE

## 2024-10-18 ENCOUNTER — APPOINTMENT (OUTPATIENT)
Dept: RADIOLOGY | Facility: CLINIC | Age: 49
End: 2024-10-18
Payer: MEDICARE

## 2024-11-06 ENCOUNTER — HOSPITAL ENCOUNTER (OUTPATIENT)
Dept: RADIOLOGY | Facility: CLINIC | Age: 49
Discharge: HOME | End: 2024-11-06
Payer: MEDICARE

## 2024-11-06 DIAGNOSIS — Z78.0 POST-MENOPAUSAL: ICD-10-CM

## 2024-11-06 PROCEDURE — 77080 DXA BONE DENSITY AXIAL: CPT | Performed by: RADIOLOGY

## 2024-11-06 PROCEDURE — 77080 DXA BONE DENSITY AXIAL: CPT

## 2024-11-25 ENCOUNTER — APPOINTMENT (OUTPATIENT)
Dept: RADIOLOGY | Facility: CLINIC | Age: 49
End: 2024-11-25
Payer: MEDICARE

## 2024-11-27 ENCOUNTER — APPOINTMENT (OUTPATIENT)
Dept: RADIOLOGY | Facility: CLINIC | Age: 49
End: 2024-11-27
Payer: MEDICARE

## 2024-12-05 ENCOUNTER — APPOINTMENT (OUTPATIENT)
Dept: PRIMARY CARE | Facility: CLINIC | Age: 49
End: 2024-12-05
Payer: MEDICARE

## 2024-12-16 ENCOUNTER — APPOINTMENT (OUTPATIENT)
Dept: RADIOLOGY | Facility: CLINIC | Age: 49
End: 2024-12-16
Payer: MEDICARE

## 2024-12-30 ENCOUNTER — LAB (OUTPATIENT)
Dept: LAB | Facility: LAB | Age: 49
End: 2024-12-30
Payer: MEDICARE

## 2024-12-30 DIAGNOSIS — E55.9 VITAMIN D DEFICIENCY: ICD-10-CM

## 2024-12-30 DIAGNOSIS — R73.9 HYPERGLYCEMIA: ICD-10-CM

## 2024-12-30 DIAGNOSIS — E78.2 MIXED HYPERLIPIDEMIA: ICD-10-CM

## 2024-12-30 DIAGNOSIS — R63.5 WEIGHT GAIN: ICD-10-CM

## 2024-12-30 LAB
25(OH)D3 SERPL-MCNC: 25 NG/ML (ref 30–100)
ALBUMIN SERPL BCP-MCNC: 3.8 G/DL (ref 3.4–5)
ALP SERPL-CCNC: 93 U/L (ref 33–110)
ALT SERPL W P-5'-P-CCNC: 18 U/L (ref 7–45)
ANION GAP SERPL CALC-SCNC: 11 MMOL/L (ref 10–20)
AST SERPL W P-5'-P-CCNC: 17 U/L (ref 9–39)
BILIRUB SERPL-MCNC: 0.3 MG/DL (ref 0–1.2)
BUN SERPL-MCNC: 17 MG/DL (ref 6–23)
CALCIUM SERPL-MCNC: 9 MG/DL (ref 8.6–10.3)
CHLORIDE SERPL-SCNC: 105 MMOL/L (ref 98–107)
CHOLEST SERPL-MCNC: 217 MG/DL (ref 0–199)
CHOLESTEROL/HDL RATIO: 3.7
CO2 SERPL-SCNC: 25 MMOL/L (ref 21–32)
CREAT SERPL-MCNC: 1.09 MG/DL (ref 0.5–1.05)
EGFRCR SERPLBLD CKD-EPI 2021: 63 ML/MIN/1.73M*2
ERYTHROCYTE [DISTWIDTH] IN BLOOD BY AUTOMATED COUNT: 16.2 % (ref 11.5–14.5)
GLUCOSE SERPL-MCNC: 107 MG/DL (ref 74–99)
HCT VFR BLD AUTO: 40.2 % (ref 36–46)
HDLC SERPL-MCNC: 58.8 MG/DL
HGB BLD-MCNC: 12.6 G/DL (ref 12–16)
LDLC SERPL CALC-MCNC: 113 MG/DL
MCH RBC QN AUTO: 29.5 PG (ref 26–34)
MCHC RBC AUTO-ENTMCNC: 31.3 G/DL (ref 32–36)
MCV RBC AUTO: 94 FL (ref 80–100)
NON HDL CHOLESTEROL: 158 MG/DL (ref 0–149)
NRBC BLD-RTO: 0 /100 WBCS (ref 0–0)
PLATELET # BLD AUTO: 285 X10*3/UL (ref 150–450)
POTASSIUM SERPL-SCNC: 4.3 MMOL/L (ref 3.5–5.3)
PROT SERPL-MCNC: 6.3 G/DL (ref 6.4–8.2)
RBC # BLD AUTO: 4.27 X10*6/UL (ref 4–5.2)
SODIUM SERPL-SCNC: 137 MMOL/L (ref 136–145)
TRIGL SERPL-MCNC: 228 MG/DL (ref 0–149)
TSH SERPL-ACNC: 1.4 MIU/L (ref 0.44–3.98)
VLDL: 46 MG/DL (ref 0–40)
WBC # BLD AUTO: 10.9 X10*3/UL (ref 4.4–11.3)

## 2024-12-30 PROCEDURE — 85027 COMPLETE CBC AUTOMATED: CPT

## 2024-12-30 PROCEDURE — 80053 COMPREHEN METABOLIC PANEL: CPT

## 2024-12-30 PROCEDURE — 80061 LIPID PANEL: CPT

## 2024-12-30 PROCEDURE — 83036 HEMOGLOBIN GLYCOSYLATED A1C: CPT

## 2024-12-30 PROCEDURE — 84443 ASSAY THYROID STIM HORMONE: CPT

## 2024-12-30 PROCEDURE — 82306 VITAMIN D 25 HYDROXY: CPT

## 2024-12-31 LAB
EST. AVERAGE GLUCOSE BLD GHB EST-MCNC: 117 MG/DL
HBA1C MFR BLD: 5.7 %

## 2025-01-06 ENCOUNTER — APPOINTMENT (OUTPATIENT)
Dept: RADIOLOGY | Facility: CLINIC | Age: 50
End: 2025-01-06
Payer: MEDICARE

## 2025-01-27 ENCOUNTER — APPOINTMENT (OUTPATIENT)
Dept: PRIMARY CARE | Facility: CLINIC | Age: 50
End: 2025-01-27
Payer: MEDICARE

## 2025-01-27 VITALS
RESPIRATION RATE: 20 BRPM | SYSTOLIC BLOOD PRESSURE: 118 MMHG | HEIGHT: 66 IN | HEART RATE: 88 BPM | TEMPERATURE: 96.8 F | WEIGHT: 252.7 LBS | DIASTOLIC BLOOD PRESSURE: 72 MMHG | BODY MASS INDEX: 40.61 KG/M2 | OXYGEN SATURATION: 98 %

## 2025-01-27 DIAGNOSIS — Z00.00 ROUTINE GENERAL MEDICAL EXAMINATION AT HEALTH CARE FACILITY: Primary | ICD-10-CM

## 2025-01-27 DIAGNOSIS — Z00.00 ENCOUNTER FOR MEDICARE ANNUAL WELLNESS EXAM: ICD-10-CM

## 2025-01-27 DIAGNOSIS — F31.81 BIPOLAR 2 DISORDER (MULTI): ICD-10-CM

## 2025-01-27 DIAGNOSIS — R73.03 PREDIABETES: ICD-10-CM

## 2025-01-27 DIAGNOSIS — R53.83 OTHER FATIGUE: ICD-10-CM

## 2025-01-27 DIAGNOSIS — R79.89 ABNORMAL CBC: ICD-10-CM

## 2025-01-27 PROBLEM — F15.20 OTHER STIMULANT DEPENDENCE, UNCOMPLICATED: Status: RESOLVED | Noted: 2024-09-19 | Resolved: 2025-01-27

## 2025-01-27 PROCEDURE — 3008F BODY MASS INDEX DOCD: CPT

## 2025-01-27 PROCEDURE — G0439 PPPS, SUBSEQ VISIT: HCPCS

## 2025-01-27 PROCEDURE — 99213 OFFICE O/P EST LOW 20 MIN: CPT

## 2025-01-27 PROCEDURE — 99396 PREV VISIT EST AGE 40-64: CPT

## 2025-01-27 PROCEDURE — 1036F TOBACCO NON-USER: CPT

## 2025-01-27 RX ORDER — BUSPIRONE HYDROCHLORIDE 10 MG/1
10 TABLET ORAL DAILY
COMMUNITY

## 2025-01-27 RX ORDER — BUPROPION HYDROCHLORIDE 150 MG/1
150 TABLET, EXTENDED RELEASE ORAL 2 TIMES DAILY
COMMUNITY

## 2025-01-27 SDOH — ECONOMIC STABILITY: FOOD INSECURITY: WITHIN THE PAST 12 MONTHS, THE FOOD YOU BOUGHT JUST DIDN'T LAST AND YOU DIDN'T HAVE MONEY TO GET MORE.: NEVER TRUE

## 2025-01-27 SDOH — ECONOMIC STABILITY: FOOD INSECURITY: WITHIN THE PAST 12 MONTHS, YOU WORRIED THAT YOUR FOOD WOULD RUN OUT BEFORE YOU GOT MONEY TO BUY MORE.: NEVER TRUE

## 2025-01-27 ASSESSMENT — ENCOUNTER SYMPTOMS
NEUROLOGICAL NEGATIVE: 1
CARDIOVASCULAR NEGATIVE: 1
ENDOCRINE NEGATIVE: 1
EYES NEGATIVE: 1
GASTROINTESTINAL NEGATIVE: 1
LOSS OF SENSATION IN FEET: 1
HEMATOLOGIC/LYMPHATIC NEGATIVE: 1
RESPIRATORY NEGATIVE: 1
DEPRESSION: 0
PSYCHIATRIC NEGATIVE: 1
OCCASIONAL FEELINGS OF UNSTEADINESS: 0
MUSCULOSKELETAL NEGATIVE: 1
CONSTITUTIONAL NEGATIVE: 1

## 2025-01-27 ASSESSMENT — PATIENT HEALTH QUESTIONNAIRE - PHQ9
1. LITTLE INTEREST OR PLEASURE IN DOING THINGS: SEVERAL DAYS
SUM OF ALL RESPONSES TO PHQ9 QUESTIONS 1 & 2: 2
SUM OF ALL RESPONSES TO PHQ9 QUESTIONS 1 AND 2: 2
1. LITTLE INTEREST OR PLEASURE IN DOING THINGS: SEVERAL DAYS
10. IF YOU CHECKED OFF ANY PROBLEMS, HOW DIFFICULT HAVE THESE PROBLEMS MADE IT FOR YOU TO DO YOUR WORK, TAKE CARE OF THINGS AT HOME, OR GET ALONG WITH OTHER PEOPLE: SOMEWHAT DIFFICULT
2. FEELING DOWN, DEPRESSED OR HOPELESS: SEVERAL DAYS
2. FEELING DOWN, DEPRESSED OR HOPELESS: SEVERAL DAYS
10. IF YOU CHECKED OFF ANY PROBLEMS, HOW DIFFICULT HAVE THESE PROBLEMS MADE IT FOR YOU TO DO YOUR WORK, TAKE CARE OF THINGS AT HOME, OR GET ALONG WITH OTHER PEOPLE: SOMEWHAT DIFFICULT

## 2025-01-27 ASSESSMENT — LIFESTYLE VARIABLES
HOW OFTEN DO YOU HAVE SIX OR MORE DRINKS ON ONE OCCASION: NEVER
HOW OFTEN DO YOU HAVE A DRINK CONTAINING ALCOHOL: NEVER
HOW MANY STANDARD DRINKS CONTAINING ALCOHOL DO YOU HAVE ON A TYPICAL DAY: PATIENT DOES NOT DRINK
SKIP TO QUESTIONS 9-10: 1
AUDIT-C TOTAL SCORE: 0

## 2025-01-27 ASSESSMENT — ACTIVITIES OF DAILY LIVING (ADL)
DOING_HOUSEWORK: INDEPENDENT
DRESSING: INDEPENDENT
TAKING_MEDICATION: INDEPENDENT
MANAGING_FINANCES: INDEPENDENT
BATHING: INDEPENDENT
GROCERY_SHOPPING: INDEPENDENT

## 2025-01-27 ASSESSMENT — ANXIETY QUESTIONNAIRES
7. FEELING AFRAID AS IF SOMETHING AWFUL MIGHT HAPPEN: NEARLY EVERY DAY
GAD7 TOTAL SCORE: 21
4. TROUBLE RELAXING: NEARLY EVERY DAY
4. TROUBLE RELAXING: NEARLY EVERY DAY
1. FEELING NERVOUS, ANXIOUS, OR ON EDGE: NEARLY EVERY DAY
6. BECOMING EASILY ANNOYED OR IRRITABLE: NEARLY EVERY DAY
IF YOU CHECKED OFF ANY PROBLEMS ON THIS QUESTIONNAIRE, HOW DIFFICULT HAVE THESE PROBLEMS MADE IT FOR YOU TO DO YOUR WORK, TAKE CARE OF THINGS AT HOME, OR GET ALONG WITH OTHER PEOPLE: VERY DIFFICULT
3. WORRYING TOO MUCH ABOUT DIFFERENT THINGS: NEARLY EVERY DAY
7. FEELING AFRAID AS IF SOMETHING AWFUL MIGHT HAPPEN: NEARLY EVERY DAY
2. NOT BEING ABLE TO STOP OR CONTROL WORRYING: NEARLY EVERY DAY
6. BECOMING EASILY ANNOYED OR IRRITABLE: NEARLY EVERY DAY
2. NOT BEING ABLE TO STOP OR CONTROL WORRYING: NEARLY EVERY DAY
5. BEING SO RESTLESS THAT IT IS HARD TO SIT STILL: NEARLY EVERY DAY
5. BEING SO RESTLESS THAT IT IS HARD TO SIT STILL: NEARLY EVERY DAY
GAD7 TOTAL SCORE: 21
1. FEELING NERVOUS, ANXIOUS, OR ON EDGE: NEARLY EVERY DAY
IF YOU CHECKED OFF ANY PROBLEMS ON THIS QUESTIONNAIRE, HOW DIFFICULT HAVE THESE PROBLEMS MADE IT FOR YOU TO DO YOUR WORK, TAKE CARE OF THINGS AT HOME, OR GET ALONG WITH OTHER PEOPLE: VERY DIFFICULT
3. WORRYING TOO MUCH ABOUT DIFFERENT THINGS: NEARLY EVERY DAY

## 2025-01-27 ASSESSMENT — PAIN SCALES - GENERAL: PAINLEVEL_OUTOF10: 0-NO PAIN

## 2025-01-27 NOTE — ASSESSMENT & PLAN NOTE
A1c 5.75 on labs from 12/2024  Diet somewhat higher in sugar/carbs    Encouraged to add more protein to diet, increase exercise.

## 2025-01-27 NOTE — ASSESSMENT & PLAN NOTE
Wellness screenings/Immunizations:  Flu vaccination: Recommended annually  PCV: PCV 20  PPSV: Deferred  Shingrix vaccine: Deferred  Colon cancer screening: Cologuard ordered, has not yet completed  Mammogram: Ordered and not completed  DEXA scan: Previously done 11/2024, osteopenia

## 2025-01-27 NOTE — ASSESSMENT & PLAN NOTE
Following with Dr. Aponte at [facility]   Maintained on bupropion SR, buspirone, trazodone  Feels like she needs med adjustment

## 2025-01-27 NOTE — ASSESSMENT & PLAN NOTE
Reports concerns about abnormal weight gain  TSH from 12/2024 WNL  Diet moderate to higher in carbs and sugars  Up 5lbs over the last 4 months    Given low carb diet handout in office today  Will check TPO and antithyroglobulin antibodies to r/o hashimoto's as cause of weight gain  Referred to [facility] di[facility] to discuss higher protein/lower carb and sugar diet, educate on healthy eating  Encouraged to drink 64+ oz water per day

## 2025-01-27 NOTE — PATIENT INSTRUCTIONS
Thank you for coming to see me today.  If you have any questions or concerns following our visit, please contact the office.  Phone: [phone]    Follow up with me in 4 months    1)  Cologuard screening will be mailed to your home, please complete within 1 week of receiving     2) Walk to radiology to see if they can fit you in for mammogram today    3) Get non-fasting labs today    4) I have referred to [facility] dietician, they will reach out to you to set up an appointment     5) Drink 64 oz or more of water per day

## 2025-01-27 NOTE — PROGRESS NOTES
Subjective   Patient ID: Praveena Strong is a 49 y.o. female who presents for medicare wellness visit and 4 month follow up.    Weight gain concern at last visit. Advised to decrease carbs/sugars in diet, increase exercise as tolerated.    Vitamin D low at 25, lipid elevated, A1c 5.7% but improved   MCHC low, needs iron studies     Breakfast is cup of coffee, 2 yogurts. Was eating cereal and 2 hours later would have stomach pain and sweating and vomiting. Though it was due to cereal or milk allergy. Not eating cereal at all. Having 2 yoplait yogurts with coffee. Lunch will be soup, air fried chicken at night. Using coconut water and frozen fruit to make smoothies.   No soda, sugary beverages. Only drinking about 32 oz per day.     Reports  suddenly quit, had mammogram scheduled but had to reschedule due to not having transportation.     Diet handout given to her in office today discussing higher protein/lower carb diet.    Diet: Boyfriend cooking at home, chicken breasts, brussel sprouts, tomato/mozzarella/spinach salad with balsamic vineger. Mediterranean salad which is lunch most day. 1 bowl of fruit loops with milk for breakfast. Eating a lot of strawberries, salad more often than not. Doesn't like to eat a lot of veggies, tends to eat them in the winter- frozen veggies.  Chicken is main protein, no beef or pork.  Lactose intolerant, not getting a lot of dairy. Not drinking pop a lot or if she does drinks clear soda. Has cut a lot of candy/sugar out of her diet  Exercise: Walking around the block twice a few times per week, on weekends with her boyfriend and walking around stores. For about 10-20 minutes daily regardless of weather. Moving with her granddaughter who is 6 and constantly moving.   Weight: Up 27lbs over the last 9 months unplanned  Water: Drinking 48 oz per day, 1 cup coffee in the morning, 1 bottle Prime per day. Sprite once or twice per month  Sleep: Good sleep, getting at least 6  hours per night. Unsure if she snores, sometimes waking up with headaches, waking up with dry mouth daily.  Has never had sleep testing. Naps daily for about an hour at a time, feels tired a few hours later but doesn't take evening nap  Social: Single- in a relationship with male partner, lives with her daughter (25) in an apartment. 14 stairs to get to apartment, no issues with stairs. No pets  Professional: Currently medically disabled, former a  and        Review of Systems   Constitutional: Negative.    HENT: Negative.     Eyes: Negative.    Respiratory: Negative.     Cardiovascular: Negative.    Gastrointestinal: Negative.    Endocrine: Negative.    Genitourinary: Negative.    Musculoskeletal: Negative.    Skin: Negative.    Neurological: Negative.    Hematological: Negative.    Psychiatric/Behavioral: Negative.          Current Outpatient Medications   Medication Sig Dispense Refill    buPROPion SR (Wellbutrin SR) 150 mg 12 hr tablet Take 1 tablet (150 mg) by mouth 2 times a day. Do not crush, chew, or split.      busPIRone (Buspar) 10 mg tablet Take 1 tablet (10 mg) by mouth once daily.      cloNIDine (Catapres) 0.1 mg tablet Take 1 tablet (0.1 mg) by mouth 2 times a day.      traZODone (Desyrel) 50 mg tablet TAKE 1 TO 2 TABLETS BY MOUTH DAILY AT BEDTIME  AS DIRECTED AS NEEDED FOR SLEEP.      ofloxacin (Ocuflox) 0.3 % ophthalmic solution instill 2 drops into the affected eye(s) 4 times per day for 5 days (Patient not taking: Reported on 1/27/2025)       No current facility-administered medications for this visit.     Past Surgical History:   Procedure Laterality Date    OTHER SURGICAL HISTORY  11/04/2022    Partial hysterectomy    OTHER SURGICAL HISTORY  11/04/2022    [place] tooth extraction     Family History   Problem Relation Name Age of Onset    Alcohol abuse Mother      Esophageal varices Father      Diabetes Father      Rheum arthritis Father      Multiple sclerosis Sister    "   No Known Problems Brother      No Known Problems Daughter        Social History     Tobacco Use    Smoking status: Former     Current packs/day: 0.50     Average packs/day: 0.5 packs/day for 30.0 years (15.0 ttl pk-yrs)     Types: Cigarettes     Passive exposure: Past    Smokeless tobacco: Never   Vaping Use    Vaping status: Some Days    Substances: Nicotine, Flavoring    Devices: Disposable, Pre-filled or refillable cartridge    Passive vaping exposure: Yes   Substance Use Topics    Alcohol use: Never    Drug use: Never        Objective     Visit Vitals  /72 (BP Location: Right arm, Patient Position: Sitting, BP Cuff Size: Large adult)   Pulse 88   Temp 36 °C (96.8 °F) (Temporal)   Resp 20   Ht 1.676 m (5' 6\")   Wt 115 kg (252 lb 11.2 oz)   SpO2 98%   BMI 40.79 kg/m²   Smoking Status Former   BSA 2.31 m²        Physical Exam  Constitutional:       Appearance: Normal appearance.   HENT:      Head: Normocephalic and atraumatic.   Eyes:      Extraocular Movements: Extraocular movements intact.      Pupils: Pupils are equal, round, and reactive to light.   Pulmonary:      Effort: Pulmonary effort is normal.   Musculoskeletal:         General: Normal range of motion.   Skin:     General: Skin is warm and dry.      Capillary Refill: Capillary refill takes less than 2 seconds.   Neurological:      General: No focal deficit present.      Mental Status: She is alert and oriented to person, place, and time.   Psychiatric:         Mood and Affect: Mood normal.         Behavior: Behavior normal.           Assessment/Plan   Problem List Items Addressed This Visit       BMI 40.0-44.9, adult (Multi)     Reports concerns about abnormal weight gain  TSH from 12/2024 WNL  Diet moderate to higher in carbs and sugars  Up 5lbs over the last 4 months    Given low carb diet handout in office today  Will check TPO and antithyroglobulin antibodies to r/o hashimoto's as cause of weight gain  Referred to [facility] di[facility] to discuss " higher protein/lower carb and sugar diet, educate on healthy eating  Encouraged to drink 64+ oz water per day           Relevant Orders    Referral to [facility]    Encounter for Medicare annual wellness exam     Wellness screenings/Immunizations:  Flu vaccination: Recommended annually  PCV: PCV 20  PPSV: Deferred  Shingrix vaccine: Deferred  Colon cancer screening: Cologuard ordered, has not yet completed  Mammogram: Ordered and not completed  DEXA scan: Previously done 11/2024, osteopenia             Bipolar 2 disorder (Multi)     Following with Dr. Aponte at [facility]   Maintained on bupropion SR, buspirone, trazodone  Feels like she needs med adjustment         Prediabetes     A1c 5.75 on labs from 12/2024  Diet somewhat higher in sugar/carbs    Encouraged to add more protein to diet, increase exercise.           Other Visit Diagnoses       Routine general medical examination at health care facility    -  Primary    Relevant Orders    1 Year Follow Up In Primary Care - Wellness Exam    Anti-Thyroglobulin Antibody    Thyroid Peroxidase (TPO) Antibody    Other fatigue        Abnormal CBC        Relevant Orders    Ferritin    Iron and TIBC            All pertinent lab work and results were reviewed with patient.     Follow up with me in 4 months    [facility]

## 2025-02-11 ENCOUNTER — PATIENT OUTREACH (OUTPATIENT)
Dept: CARE COORDINATION | Facility: CLINIC | Age: 50
End: 2025-02-11
Payer: MEDICARE

## 2025-02-19 ENCOUNTER — APPOINTMENT (OUTPATIENT)
Dept: CARE COORDINATION | Facility: CLINIC | Age: 50
End: 2025-02-19
Payer: MEDICARE

## 2025-04-02 ENCOUNTER — PATIENT OUTREACH (OUTPATIENT)
Dept: CARE COORDINATION | Facility: CLINIC | Age: 50
End: 2025-04-02
Payer: MEDICARE

## 2025-04-14 ENCOUNTER — APPOINTMENT (OUTPATIENT)
Dept: RADIOLOGY | Facility: CLINIC | Age: 50
End: 2025-04-14
Payer: MEDICARE

## 2025-05-05 ENCOUNTER — APPOINTMENT (OUTPATIENT)
Dept: RADIOLOGY | Facility: CLINIC | Age: 50
End: 2025-05-05
Payer: MEDICARE

## 2025-05-15 ENCOUNTER — APPOINTMENT (OUTPATIENT)
Dept: RADIOLOGY | Facility: CLINIC | Age: 50
End: 2025-05-15
Payer: MEDICARE

## 2025-05-27 ENCOUNTER — APPOINTMENT (OUTPATIENT)
Dept: PRIMARY CARE | Facility: CLINIC | Age: 50
End: 2025-05-27
Payer: MEDICARE

## 2025-07-08 DIAGNOSIS — Z12.31 ENCOUNTER FOR SCREENING MAMMOGRAM FOR BREAST CANCER: ICD-10-CM

## 2025-07-17 ENCOUNTER — TELEPHONE (OUTPATIENT)
Dept: PRIMARY CARE | Facility: CLINIC | Age: 50
End: 2025-07-17
Payer: MEDICARE

## 2025-07-31 ENCOUNTER — TELEPHONE (OUTPATIENT)
Dept: PRIMARY CARE | Facility: CLINIC | Age: 50
End: 2025-07-31

## 2025-07-31 ENCOUNTER — APPOINTMENT (OUTPATIENT)
Dept: RADIOLOGY | Facility: CLINIC | Age: 50
End: 2025-07-31
Payer: MEDICARE

## 2025-07-31 VITALS — HEIGHT: 66 IN | BODY MASS INDEX: 40.5 KG/M2 | WEIGHT: 252 LBS

## 2025-07-31 DIAGNOSIS — Z12.31 ENCOUNTER FOR SCREENING MAMMOGRAM FOR BREAST CANCER: ICD-10-CM

## 2025-07-31 DIAGNOSIS — Z12.11 COLON CANCER SCREENING: Primary | ICD-10-CM

## 2025-07-31 PROCEDURE — 77063 BREAST TOMOSYNTHESIS BI: CPT | Performed by: RADIOLOGY

## 2025-07-31 PROCEDURE — 77067 SCR MAMMO BI INCL CAD: CPT

## 2025-07-31 PROCEDURE — 77067 SCR MAMMO BI INCL CAD: CPT | Performed by: RADIOLOGY

## 2025-07-31 NOTE — TELEPHONE ENCOUNTER
Patient was in office and said that Magalis Hutchison scheduled her for a Cologuard and she would prefer a referral for a Colonoscopy. Sorry forgot to ask for current symptoms.

## 2025-08-02 LAB
FERRITIN SERPL-MCNC: 92 NG/ML (ref 16–232)
IRON SATN MFR SERPL: 19 % (CALC) (ref 16–45)
IRON SERPL-MCNC: 63 MCG/DL (ref 40–190)
THYROGLOB AB SERPL-ACNC: <1 IU/ML
THYROPEROXIDASE AB SERPL-ACNC: <1 IU/ML
TIBC SERPL-MCNC: 325 MCG/DL (CALC) (ref 250–450)

## 2025-08-05 ENCOUNTER — PREP FOR PROCEDURE (OUTPATIENT)
Dept: SURGERY | Facility: HOSPITAL | Age: 50
End: 2025-08-05
Payer: MEDICARE

## 2025-08-05 DIAGNOSIS — R73.03 PREDIABETES: ICD-10-CM

## 2025-08-05 DIAGNOSIS — Z12.11 COLON CANCER SCREENING: Primary | ICD-10-CM

## 2025-08-05 RX ORDER — POLYETHYLENE GLYCOL 3350, SODIUM SULFATE ANHYDROUS, SODIUM BICARBONATE, SODIUM CHLORIDE, POTASSIUM CHLORIDE 236; 22.74; 6.74; 5.86; 2.97 G/4L; G/4L; G/4L; G/4L; G/4L
4000 POWDER, FOR SOLUTION ORAL ONCE
Qty: 4000 ML | Refills: 0 | Status: SHIPPED | OUTPATIENT
Start: 2025-08-05 | End: 2025-08-05

## 2025-09-16 ENCOUNTER — APPOINTMENT (OUTPATIENT)
Dept: PRIMARY CARE | Facility: CLINIC | Age: 50
End: 2025-09-16
Payer: MEDICARE

## 2026-01-28 ENCOUNTER — APPOINTMENT (OUTPATIENT)
Dept: PRIMARY CARE | Facility: CLINIC | Age: 51
End: 2026-01-28
Payer: MEDICARE